# Patient Record
Sex: FEMALE | Race: BLACK OR AFRICAN AMERICAN
[De-identification: names, ages, dates, MRNs, and addresses within clinical notes are randomized per-mention and may not be internally consistent; named-entity substitution may affect disease eponyms.]

---

## 2017-12-27 ENCOUNTER — HOSPITAL ENCOUNTER (EMERGENCY)
Dept: HOSPITAL 17 - NEPD | Age: 22
Discharge: HOME | End: 2017-12-27
Payer: SELF-PAY

## 2017-12-27 VITALS
OXYGEN SATURATION: 98 % | RESPIRATION RATE: 20 BRPM | SYSTOLIC BLOOD PRESSURE: 136 MMHG | TEMPERATURE: 99.4 F | DIASTOLIC BLOOD PRESSURE: 80 MMHG | HEART RATE: 102 BPM

## 2017-12-27 DIAGNOSIS — Z90.81: ICD-10-CM

## 2017-12-27 DIAGNOSIS — J02.0: Primary | ICD-10-CM

## 2017-12-27 PROCEDURE — 99284 EMERGENCY DEPT VISIT MOD MDM: CPT

## 2017-12-27 NOTE — PD
HPI


Chief Complaint:  ENT Complaint


Time Seen by Provider:  14:54


Travel History


International Travel<30 days:  No


Contact w/Intl Traveler<30days:  No


Traveled to known affect area:  No





History of Present Illness


HPI


This patient complains of sore throat and pain on swallowing.  Duration is 3 

days.  Severity is moderate.  No alleviating factors.  Symptoms exacerbated by 

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  No 

documented fever but she felt like she may have had fever.





PFSH


Past Medical History


Anemia:  Yes (THROMBOCYTOPENIA)


Blood Disorders:  Yes (LOW PLATLETS/ITP)


Anxiety:  Yes


Depression:  No


Heart Rhythm Problems:  Yes (HEART MURMUR)


Cancer:  No


Cardiovascular Problems:  Yes


High Cholesterol:  No


Chest Pain:  No


Congestive Heart Failure:  No


Developmental Delay:  No


Diminished Hearing:  No


Endocrine:  No


Gastrointestinal Disorders:  No


Genitourinary:  No


Implanted Vascular Access Dvce:  No


Musculoskeletal:  No


Psychiatric:  Yes


Reproductive:  No


Respiratory:  No


Immunizations Current:  Yes


Migraines:  Yes


Pregnant?:  Not Pregnant


LMP:  2017


:  0





Past Surgical History


Abdominal Surgery:  Yes (splenectomy)


Other Surgery:  No





Social History


Alcohol Use:  No


Tobacco Use:  No


Substance Use:  No





Allergies-Medications


(Allergen,Severity, Reaction):  


Coded Allergies:  


     aspirin (Unverified  Allergy, Severe, BLEEDING, 8/15/17)


     Rho(D) immune globulin (Verified  Adverse Reaction, Severe, 17)


     maltose (Verified  Adverse Reaction, Severe, 17)


Uncoded Allergies:  


     blood thinners (Adverse Reaction, Severe, 17)


 has ITP


Reported Meds & Prescriptions





Reported Meds & Active Scripts


Active


Penicillin V Potassium 500 Mg Tab 500 Mg PO Q6H 7 Days


Magic Mouthwash Adult Liq (Multi-Ingredient Mouthwash/Gargle) 120 Ml Susp 10 Ml 

SWISH-SWAL ACHS


     Each 5mL contains: Nystatin 200,000units,


     Diphenhydramine 4.25mg, Viscous Lidocaine 10mg,


     Cherry syrup 0.8 mL


Robaxin-750 (Methocarbamol) 750 Mg Tab 750 Mg PO TID


Reported


Zantac 150 Mg Tab (Ranitidine HCl) 150 Mg Tab 150 Mg PO BID


Deltasone 20 Mg Tab (Prednisone) 20 Mg Tab 20 Mg PO TID


Ferrous Sulfate 140 Mg Tab 325 Mg PO DAILY PRN


Generess Fe (Norethindrone & Ethinyl Estrad)  Chw 1 Tab OR DAILY 28 Days








Review of Systems


General / Constitutional:  Positive: Fever


Eyes:  No: Visual changes


HENT:  Positive: Sore Throat, No: Headaches


Cardiovascular:  No: Chest Pain or Discomfort


Respiratory:  No: Shortness of Breath


Gastrointestinal:  No: Abdominal Pain


Genitourinary:  No: Dysuria


Musculoskeletal:  No: Pain


Skin:  No Rash


Neurologic:  No: Weakness


Psychiatric:  No: Depression


Endocrine:  No: Polydipsia


Hematologic/Lymphatic:  No: Easy Bruising





Physical Exam


Narrative


RESPIRATORY: Respiratory effort unlabored, no retractions or use of accessory 

muscles.  Breath sounds are clear and symmetric.





NECK:  Symmetrical appearance, midline trachea.  No mass or crepitus.  Thyroid 

without enlargement, tenderness, or mass.  Has large bilateral submandibular 

lymph nodes palpable





SKIN: Focused skin assessment reveals no rash or ulcers. Skin is warm and dry.  

Palpation shows no induration or nodules.





TMs normal





Oral cavity: Erythematous posterior pharynx.  No exudate seen.  Uvula midline.  

No findings consistent with peritonsillar abscess though she does have large 

tonsils.





Data


Data


Last Documented VS





Vital Signs








  Date Time  Temp Pulse Resp B/P (MAP) Pulse Ox O2 Delivery O2 Flow Rate FiO2


 


17 14:39 99.4 102 20 136/80 (98) 98 Room Air  











MDM


Medical Decision Making


Medical Screen Exam Complete:  Yes


Emergency Medical Condition:  Yes


Medical Record Reviewed:  Yes


Differential Diagnosis


Pharyngitis, tonsillitis, peritonsillar abscess


Narrative Course


I have reviewed the patient's electronic medical record.








Presentation consistent with strep pharyngitis.  Given her history of 

splenectomy I will give her antibiotics and be cautious with that and I don't 

think culturing then would 





I am going to give her penicillin


Should follow-up with [facility] to [facility] or return to the ER if 

worsening at equal


Magic mouthwash given for symptom relief





Diagnosis





 Primary Impression:  


 Pharyngitis, acute


 Qualified Codes:  J02.0 - Streptococcal pharyngitis


 Additional Impression:  


 History of splenectomy





***Additional Instructions:  


The patient was advised to follow up with their physician and return if they 

worsen.


The patient was warned about potential sedation for the medications they will 

receive on prescription.


***Med/Other Pt SpecificInfo:  Prescription(s) given


Scripts


Penicillin V Potassium (Penicillin V Potassium) 500 Mg Tab


500 MG PO Q6H for Infection for 7 Days, #28 TAB 0 Refills


   Prov: Caden Longoria MD         17 


Nystatin-Diphenhydramine-Lidocaine Liq (Magic Mouthwash Adult Liq) 120 Ml Susp


10 ML SWISH-SWAL ACHS for Mouth sores, #120 ML 0 Refills


   Each 5mL contains: Nystatin 200,000units,


   Diphenhydramine 4.25mg, Viscous Lidocaine 10mg,


   Cherry syrup 0.8 mL


   Prov: Caden Longoria MD         17


Disposition:  01 DISCHARGE HOME


Condition:  Stable











Caden Longoria MD Dec 27, 2017 15:29

## 2018-02-16 ENCOUNTER — HOSPITAL ENCOUNTER (EMERGENCY)
Dept: HOSPITAL 17 - NEPD | Age: 23
Discharge: HOME | End: 2018-02-16
Payer: SELF-PAY

## 2018-02-16 VITALS — WEIGHT: 209.44 LBS | HEIGHT: 66 IN | BODY MASS INDEX: 33.66 KG/M2

## 2018-02-16 VITALS
DIASTOLIC BLOOD PRESSURE: 82 MMHG | HEART RATE: 107 BPM | TEMPERATURE: 99.8 F | SYSTOLIC BLOOD PRESSURE: 136 MMHG | RESPIRATION RATE: 18 BRPM | OXYGEN SATURATION: 99 %

## 2018-02-16 VITALS — DIASTOLIC BLOOD PRESSURE: 74 MMHG | TEMPERATURE: 99.1 F | SYSTOLIC BLOOD PRESSURE: 136 MMHG

## 2018-02-16 DIAGNOSIS — D69.3: ICD-10-CM

## 2018-02-16 DIAGNOSIS — Z90.81: ICD-10-CM

## 2018-02-16 DIAGNOSIS — J11.1: Primary | ICD-10-CM

## 2018-02-16 PROCEDURE — 71046 X-RAY EXAM CHEST 2 VIEWS: CPT

## 2018-02-16 PROCEDURE — 87804 INFLUENZA ASSAY W/OPTIC: CPT

## 2018-02-16 PROCEDURE — 99284 EMERGENCY DEPT VISIT MOD MDM: CPT

## 2018-02-16 NOTE — RADRPT
EXAM DATE/TIME:  02/16/2018 16:15 

 

[facility] COMPARISON:     

No previous studies available for comparison.

 

                     

INDICATIONS :     

Cough and chest pain x 1 week.

                     

 

MEDICAL HISTORY :     

None.          

 

SURGICAL HISTORY :     

None.   

 

ENCOUNTER:     

Initial                                        

 

ACUITY:     

1 day      

 

PAIN SCORE:     

10/10

 

LOCATION:     

Bilateral chest 

 

FINDINGS:     

PA and lateral views of the chest demonstrate the lungs to be symmetrically aerated without evidence 
of mass, infiltrate or effusion.  The cardiomediastinal contours are unremarkable.  Osseous structure
s are intact. Surgical clips project over the left upper abdominal quadrant.

 

CONCLUSION:     

No acute cardiopulmonary process. Lungs are clear.

 

 

 

 Luis Miguel Rocha MD on February 16, 2018 at 16:26           

Board Certified Radiologist.

 This report was verified electronically.

## 2018-02-16 NOTE — PD
HPI


Chief Complaint:  Cold / Flu Symptoms


Time Seen by Provider:  15:46


Travel History


International Travel<30 days:  No


Contact w/Intl Traveler<30days:  No


Traveled to known affect area:  No





History of Present Illness


HPI


Is a 19-year-old woman who presents to the emergency department complaining of 

flulike symptoms ongoing for the past 5 days associated with headache shortness 

of breath migraine some nausea vomiting, decreased appetite.  She is a history 

of ITP in the past.  She has also had sleep apnea in the past.  She had some 

shortness of breath with it.  She otherwise had been feeling generally well and 

healthy.





History


Past Medical History


*** Narrative Medical


History of ITP, status post splenectomy in 


[place]


Tetanus Vaccination:  Never Vaccinated


Influenza Vaccination:  No


LMP:  "last month"


:  0





Past Surgical History


Surgical History:  No Previous Surgery





Social History


Alcohol Use:  No


Tobacco Use:  No





Allergies-Medications


(Allergen,Severity, Reaction):  


Coded Allergies:  


     aspirin (Unverified  Allergy, Severe, BLEEDING, 18)


     Rho(D) immune globulin (Verified  Adverse Reaction, Severe, 18)


     maltose (Verified  Adverse Reaction, Severe, 18)


Uncoded Allergies:  


     blood thinners (Adverse Reaction, Severe, 17)


 has ITP


Reported Meds & Prescriptions





Reported Meds & Active Scripts


Active


No Active Prescriptions or Reported Medications    








Review of Systems


Except as stated in HPI:  all other systems reviewed are Neg





Physical Exam


Narrative


GENERAL: Well-appearing 19-year-old woman, no acute distress.


SKIN: Focused skin assessment warm/dry.


HEAD: Atraumatic. Normocephalic. 


EYES: Pupils equal and round. No scleral icterus. No injection or drainage. 


ENT: No nasal bleeding or discharge.  Mucous membranes pink and moist.


NECK: Trachea midline. No JVD. 


CARDIOVASCULAR: Regular rate and rhythm.  No murmur appreciated.


RESPIRATORY: No accessory muscle use. Clear to auscultation. Breath sounds 

equal bilaterally. 


GASTROINTESTINAL: Abdomen soft, non-tender, nondistended. Hepatic and splenic 

margins not palpable. 


MUSCULOSKELETAL: No obvious deformities. No clubbing.  No cyanosis.  No edema. 


NEUROLOGICAL: Awake and alert. No obvious cranial nerve deficits.  Motor 

grossly within normal limits. Normal speech.


PSYCHIATRIC: Appropriate mood and affect; insight and judgment normal.





Data


Data


Last Documented VS





Vital Signs








  Date Time  Temp Pulse Resp B/P (MAP) Pulse Ox O2 Delivery O2 Flow Rate FiO2


 


18 14:34 99.8 107 18 136/82 (100) 99 Room Air  








Orders





 Orders


Influenzae A/B Antigen (18 14:44)


Acetaminophen (Tylenol) (18 16:00)


Chest, Pa & Lat (18 )








MDM


Medical Decision Making


Medical Screen Exam Complete:  Yes


Emergency Medical Condition:  Yes


Interpretation(s)


Influenza negative


Chest x-ray negative


Differential Diagnosis


Flu, pneumonia, URI, other


Narrative Course


Medical decision making





19-year-old woman presents to the emergency department clinical flu symptoms, 

ongoing for the past 5 days or so.  Looks well.  Chest x-ray normal.  History 

of ITP in the past.  Will treat nausea vomiting with some Zofran.  Continue IV 

fluids, supportive treatment.





Diagnosis





 Primary Impression:  


 Influenza-like illness





***Additional Instructions:  


Use Zofran as needed for nausea or vomiting.





Take acetaminophen or ibuprofen as a for fever or body aches.





You can return to work after you have had no fever for 24 hours.





Drink plenty of fluids to stay well-hydrated.





Follow-up with your primary doctor if you are not completely well in 7-10 days.





Return to the emergency department for any worsening chest pain, trouble 

breathing, or any other new or worsening symptoms.


***Med/Other Pt SpecificInfo:  Prescription(s) given


Scripts


Ondansetron Odt (Zofran Odt) 4 Mg Tab


4 MG SL Q8HR Y for Nausea/Vomiting, #15 TAB 0 Refills


   Prov: Johnny Bello MD         18


Disposition:  01 DISCHARGE HOME


Condition:  Stable











Johnny Bello MD 2018 16:55